# Patient Record
(demographics unavailable — no encounter records)

---

## 2017-11-07 NOTE — XRAY REPORT
Chest with left ribs:



History: Left rib pain after injury.



Findings:



No fracture or lytic lesion. The lung parenchyma appears unremarkable. 

No pneumothorax consolidation or pleural effusion.



Impression:



No acute lung changes. No evidence of acute fracture.

## 2017-11-07 NOTE — XRAY REPORT
Left shoulder 3 views:



History: Injury to left shoulder with pain.



Findings:



No bony or articular abnormality. No fracture dislocation or soft 

tissue calcification.



Impression:



Essentially negative left shoulder.

## 2017-11-07 NOTE — EMERGENCY DEPARTMENT REPORT
Upper Extremity





- HPI


Chief Complaint: Extremity Injury, Upper


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


Time Seen by Provider: 11/07/17 09:05


Upper Extremity: Left Shoulder (pain after pushing a heavy object)


Occurred When: 1 Day


Mechanism: Other (push and heavy object)


Symptoms: Yes Pain with Movement (left shoulder and left rib anterior and 

posterior), No Deformity, No Limited Range of Movement, No Numbness, No Weakness

, No Swelling, No Bruising/Ecchymosis, No Laceration or Abrasion


Other History: Seen here report that she was at work and was pushing in 300 

pound individual in a wheelchair and she said that she accidentally injured her 

chest on the left side and left upper back along which shoulder.  Denies any 

radiation of pain.  Denies any fall.  She said pain is 8 out of 10 with 

movement.  Pain is sharp.  No over-the-counter medication taken.  Pain is worse 

with movement better with rest.





ED Review of Systems


ROS: 


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


Other details as noted in HPI





Comment: All other systems reviewed and negative


Constitutional: no symptoms reported


Respiratory: no symptoms reported


Cardiovascular: denies: chest pain, palpitations, dyspnea on exertion, edema, 

syncope


Musculoskeletal: back pain (upper back pain), arthralgia (shoulder pain), other 

(left chest wall pain anterior shoulder).  denies: joint swelling, myalgia


Skin: denies: rash


Neurological: denies: headache, numbness, paresthesias, confusion, abnormal gait

, vertigo





ED Past Medical Hx





- Past Medical History


Previous Medical History?: No





- Surgical History


Past Surgical History?: Yes


Additional Surgical History: left knee





- Family History


Family history: no significant





- Social History


Smoking Status: Former Smoker


Substance Use Type: Alcohol





- Medications


Home Medications: 


 Home Medications











 Medication  Instructions  Recorded  Confirmed  Last Taken  Type


 


Cyclobenzaprine [Flexeril] 10 mg PO TID PRN #15 tablet 11/07/17  Unknown Rx


 


Ibuprofen [Motrin] 600 mg PO Q8H PRN #15 tablet 11/07/17  Unknown Rx














Upper Extremity Exam





- Exam


General: 


Vital signs noted. No distress. Alert and acting appropriately.


This is a 24-year-old female well-nourished well-developed in no acute distress


Head and Torso: No HEENT Abnormality, No Neck Tenderness, No Chest/Lungs 

Abnormality, No Abdominal Tenderness, No Back Tenderness


Shoulder Exam: Yes Shoulder Tenderness (left shoulder), Yes Normal Range of 

Motion in Shoulder, No Clavicle Tenderness, No Shoulder Deformity, No AC Joint 

Tenderness


Arm Exam: No Arm/Humerus Tenderness, No Arm Deformity


Elbow: Yes Normal Range of Motion in Elbow, No Elbow Tenderness, No Elbow 

Deformity


Forearm: No Forearm Tenderness, No Forearm Deformity, No Pain with Pronation, 

No Pain with Supination


Wrist: Yes Normal ROM in Wrist, No Wrist Tenderness, No Wrist Deformity, No 

Snuffbox Tenderness, No Pain with Axial Thumb Compression


Hand: Yes Normal ROM in Digit(s), No Hand Tenderness, No Hand Deformity, No 

Digit Tenderness, No Digit(s) Deformity, No Tendon Dysfunction


CMS Exam: Yes Normal Distal Pulses, Yes Normal Capillary Refill, Yes Normal 

Distal Sensation, No Broken Skin





ED Course


 Vital Signs











  11/07/17





  08:25


 


Temperature 98.4 F


 


Pulse Rate 79


 


Respiratory 16





Rate 


 


Blood Pressure 106/63


 


O2 Sat by Pulse 98





Oximetry 














- Reevaluation(s)


Reevaluation #1: 





11/07/17 11:49


Patient was given Motrin 800 mg by mouth in emergency room which she said 

relieved her pain.





ED Medical Decision Making





- Radiology Data


Radiology results: image reviewed


interpreted by me: 





X-ray of left rib series and PA chest reveal no acute bony abnormalities and no 

acute cardiopulmonary findings.  This was reviewed by myself and Dr. Carias.





- Medical Decision Making





ED course: Patient status post injury to left shoulder and left upper back and 

left anterior chest from pushing a heavy object yesterday.  She was given 

Motrin 800 mg by mouth and emergency room which helped her pain.  X-ray of left 

shoulder reveals no acute bony abnormality.  X-ray left rib series revealed no 

acute findings.  This was discussed with patient along with diagnosis and 

treatment plan and she voiced understanding.  Patient discharged home in stable 

condition to follow up with her primary care physician in 2-3 days.  Discharged 

home with prescription for Flexeril and Motrin


Critical care attestation.: 


If time is entered above; I have spent that time in minutes in the direct care 

of this critically ill patient, excluding procedure time.








ED Disposition


Clinical Impression: 


 Arthralgia of left shoulder region, Musculoskeletal pain





Muscle strain of left upper back


Qualifiers:


 Encounter type: initial encounter Qualified Code(s): S29.012A - Strain of 

muscle and tendon of back wall of thorax, initial encounter





Disposition: DC-01 TO HOME OR SELFCARE


Is pt being admited?: No


Does the pt Need Aspirin: No


Condition: Stable


Instructions:  Muscle Strain (ED), Arthralgia (ED), Back Pain (ED), RICE 

Therapy (ED)


Additional Instructions: 


Please rest, ice, compress and elevate affected area for 72 hours


Please follow up with orthopedic doctor in 3 days if you do not get relief of 

pain


Please do not drive or operate heavy machinery while taking Flexeril as this 

medication causes drowsiness


Motrin is the anti-inflammatory and this will help pain but please take this 

medication with food.


Prescriptions: 


Cyclobenzaprine [Flexeril] 10 mg PO TID PRN #15 tablet


 PRN Reason: Muscle Spasm


Ibuprofen [Motrin] 600 mg PO Q8H PRN #15 tablet


 PRN Reason: Pain


Referrals: 


PRIMARY CARE,MD [Primary Care Provider] - 2-3 Days


ROSEMARY BLANCHARD MD [Staff Physician] - 2-3 Days


Forms:  Work/School Release Form(ED)